# Patient Record
Sex: FEMALE | Race: BLACK OR AFRICAN AMERICAN | NOT HISPANIC OR LATINO | URBAN - METROPOLITAN AREA
[De-identification: names, ages, dates, MRNs, and addresses within clinical notes are randomized per-mention and may not be internally consistent; named-entity substitution may affect disease eponyms.]

---

## 2024-09-26 ENCOUNTER — OFFICE VISIT (OUTPATIENT)
Dept: FAMILY MEDICINE | Facility: CLINIC | Age: 19
End: 2024-09-26
Payer: COMMERCIAL

## 2024-09-26 VITALS
OXYGEN SATURATION: 98 % | RESPIRATION RATE: 16 BRPM | HEART RATE: 88 BPM | HEIGHT: 63 IN | WEIGHT: 251 LBS | DIASTOLIC BLOOD PRESSURE: 78 MMHG | SYSTOLIC BLOOD PRESSURE: 115 MMHG | BODY MASS INDEX: 44.47 KG/M2

## 2024-09-26 DIAGNOSIS — Z00.00 WELLNESS EXAMINATION: Primary | ICD-10-CM

## 2024-09-26 DIAGNOSIS — E66.01 CLASS 3 SEVERE OBESITY DUE TO EXCESS CALORIES WITHOUT SERIOUS COMORBIDITY WITH BODY MASS INDEX (BMI) OF 40.0 TO 44.9 IN ADULT: ICD-10-CM

## 2024-09-26 DIAGNOSIS — F41.1 GENERALIZED ANXIETY DISORDER: ICD-10-CM

## 2024-09-26 RX ORDER — NORGESTIMATE AND ETHINYL ESTRADIOL 7DAYSX3 28
1 KIT ORAL DAILY
COMMUNITY
Start: 2024-08-31

## 2024-09-26 RX ORDER — BUSPIRONE HYDROCHLORIDE 5 MG/1
5 TABLET ORAL 2 TIMES DAILY PRN
Qty: 60 TABLET | Refills: 2 | Status: SHIPPED | OUTPATIENT
Start: 2024-09-26 | End: 2024-12-25

## 2024-09-26 NOTE — PATIENT INSTRUCTIONS
Skip Harry,     If you are due for any health screening(s) below please notify me so we can arrange them to be ordered and scheduled. Most healthy patients at your age complete them, but you are free to accept or refuse.     If you can't do it, I'll definitely understand. If you can, I'd certainly appreciate it!    Tests to Keep You Healthy    Cervical Cancer Screening: DUE      Your cervical cancer screening is due     Our records indicate that you may be overdue for your screening Pap smear. A Pap smear is an important health screening that can detect abnormal cells that can become cervical cancer. Cervical cancer screenings allow for early diagnosis and increase the likelihood of successful treatment.     The current recommendation for Pap smear screening is every 3-5 years for women at average risk. We encourage you to schedule your appointment with your womens health provider. Many women see a gynecologist for this screening, but some primary care providers also provide Pap screening.     If you recently had your Pap smear screening performed outside of [facility], please let your health care team know so that they can update your health record.

## 2024-09-30 ENCOUNTER — LAB VISIT (OUTPATIENT)
Dept: LAB | Facility: HOSPITAL | Age: 19
End: 2024-09-30
Attending: FAMILY MEDICINE
Payer: COMMERCIAL

## 2024-09-30 DIAGNOSIS — D64.9 ANEMIA, UNSPECIFIED TYPE: ICD-10-CM

## 2024-09-30 DIAGNOSIS — Z00.00 WELLNESS EXAMINATION: ICD-10-CM

## 2024-09-30 DIAGNOSIS — E55.9 VITAMIN D DEFICIENCY: ICD-10-CM

## 2024-09-30 DIAGNOSIS — R82.71 BACTERIA IN URINE: Primary | ICD-10-CM

## 2024-09-30 LAB
25(OH)D3+25(OH)D2 SERPL-MCNC: 12 NG/ML (ref 30–80)
ALBUMIN SERPL-MCNC: 3.2 G/DL (ref 3.5–5)
ALBUMIN/GLOB SERPL: 0.9 RATIO (ref 1.1–2)
ALP SERPL-CCNC: 69 UNIT/L (ref 40–150)
ALT SERPL-CCNC: 9 UNIT/L (ref 0–55)
ANION GAP SERPL CALC-SCNC: 5 MEQ/L
AST SERPL-CCNC: 10 UNIT/L (ref 5–34)
BACTERIA #/AREA URNS AUTO: ABNORMAL /HPF
BASOPHILS # BLD AUTO: 0.05 X10(3)/MCL
BASOPHILS NFR BLD AUTO: 0.9 %
BILIRUB SERPL-MCNC: 0.4 MG/DL
BILIRUB UR QL STRIP.AUTO: NEGATIVE
BUN SERPL-MCNC: 8.2 MG/DL (ref 7–18.7)
CALCIUM SERPL-MCNC: 8.6 MG/DL (ref 8.4–10.2)
CHLORIDE SERPL-SCNC: 110 MMOL/L (ref 98–107)
CHOLEST SERPL-MCNC: 161 MG/DL
CHOLEST/HDLC SERPL: 4 {RATIO} (ref 0–5)
CLARITY UR: CLEAR
CO2 SERPL-SCNC: 24 MMOL/L (ref 22–29)
COLOR UR AUTO: YELLOW
CREAT SERPL-MCNC: 0.71 MG/DL (ref 0.55–1.02)
CREAT/UREA NIT SERPL: 12
EOSINOPHIL # BLD AUTO: 0.17 X10(3)/MCL (ref 0–0.9)
EOSINOPHIL NFR BLD AUTO: 2.9 %
ERYTHROCYTE [DISTWIDTH] IN BLOOD BY AUTOMATED COUNT: 12.5 % (ref 11.5–17)
EST. AVERAGE GLUCOSE BLD GHB EST-MCNC: 99.7 MG/DL
GFR SERPLBLD CREATININE-BSD FMLA CKD-EPI: >60 ML/MIN/1.73/M2
GLOBULIN SER-MCNC: 3.5 GM/DL (ref 2.4–3.5)
GLUCOSE SERPL-MCNC: 92 MG/DL (ref 74–100)
GLUCOSE UR QL STRIP: NORMAL
HBA1C MFR BLD: 5.1 %
HCT VFR BLD AUTO: 36.4 % (ref 37–47)
HCV AB SERPL QL IA: NONREACTIVE
HDLC SERPL-MCNC: 44 MG/DL (ref 35–60)
HGB BLD-MCNC: 11.7 G/DL (ref 12–16)
HGB UR QL STRIP: ABNORMAL
HIV 1+2 AB+HIV1 P24 AG SERPL QL IA: NONREACTIVE
IMM GRANULOCYTES # BLD AUTO: 0.01 X10(3)/MCL (ref 0–0.04)
IMM GRANULOCYTES NFR BLD AUTO: 0.2 %
KETONES UR QL STRIP: NEGATIVE
LDLC SERPL CALC-MCNC: 98 MG/DL (ref 50–140)
LEUKOCYTE ESTERASE UR QL STRIP: 75
LYMPHOCYTES # BLD AUTO: 2.65 X10(3)/MCL (ref 0.6–4.6)
LYMPHOCYTES NFR BLD AUTO: 45.8 %
MCH RBC QN AUTO: 29.3 PG (ref 27–31)
MCHC RBC AUTO-ENTMCNC: 32.1 G/DL (ref 33–36)
MCV RBC AUTO: 91 FL (ref 80–94)
MONOCYTES # BLD AUTO: 0.34 X10(3)/MCL (ref 0.1–1.3)
MONOCYTES NFR BLD AUTO: 5.9 %
MUCOUS THREADS URNS QL MICRO: ABNORMAL /LPF
NEUTROPHILS # BLD AUTO: 2.57 X10(3)/MCL (ref 2.1–9.2)
NEUTROPHILS NFR BLD AUTO: 44.3 %
NITRITE UR QL STRIP: NEGATIVE
NRBC BLD AUTO-RTO: 0 %
PH UR STRIP: 6 [PH]
PLATELET # BLD AUTO: 255 X10(3)/MCL (ref 130–400)
PMV BLD AUTO: 10.7 FL (ref 7.4–10.4)
POTASSIUM SERPL-SCNC: 4 MMOL/L (ref 3.5–5.1)
PROT SERPL-MCNC: 6.7 GM/DL (ref 6.4–8.3)
PROT UR QL STRIP: ABNORMAL
RBC # BLD AUTO: 4 X10(6)/MCL (ref 4.2–5.4)
RBC #/AREA URNS AUTO: ABNORMAL /HPF
SODIUM SERPL-SCNC: 139 MMOL/L (ref 136–145)
SP GR UR STRIP.AUTO: 1.03 (ref 1–1.03)
SQUAMOUS #/AREA URNS LPF: ABNORMAL /HPF
TRIGL SERPL-MCNC: 93 MG/DL (ref 37–140)
TSH SERPL-ACNC: 4.36 UIU/ML (ref 0.35–4.94)
UROBILINOGEN UR STRIP-ACNC: NORMAL
VLDLC SERPL CALC-MCNC: 19 MG/DL
WBC # BLD AUTO: 5.79 X10(3)/MCL (ref 4.5–11.5)
WBC #/AREA URNS AUTO: ABNORMAL /HPF

## 2024-09-30 PROCEDURE — 83036 HEMOGLOBIN GLYCOSYLATED A1C: CPT

## 2024-09-30 PROCEDURE — 81001 URINALYSIS AUTO W/SCOPE: CPT

## 2024-09-30 PROCEDURE — 87591 N.GONORRHOEAE DNA AMP PROB: CPT

## 2024-09-30 PROCEDURE — 87389 HIV-1 AG W/HIV-1&-2 AB AG IA: CPT

## 2024-09-30 PROCEDURE — 86803 HEPATITIS C AB TEST: CPT

## 2024-09-30 PROCEDURE — 80061 LIPID PANEL: CPT

## 2024-09-30 PROCEDURE — 36415 COLL VENOUS BLD VENIPUNCTURE: CPT

## 2024-09-30 PROCEDURE — 80053 COMPREHEN METABOLIC PANEL: CPT

## 2024-09-30 PROCEDURE — 85025 COMPLETE CBC W/AUTO DIFF WBC: CPT

## 2024-09-30 PROCEDURE — 81015 MICROSCOPIC EXAM OF URINE: CPT

## 2024-09-30 PROCEDURE — 84443 ASSAY THYROID STIM HORMONE: CPT

## 2024-09-30 PROCEDURE — 87491 CHLMYD TRACH DNA AMP PROBE: CPT

## 2024-09-30 PROCEDURE — 82306 VITAMIN D 25 HYDROXY: CPT

## 2024-09-30 RX ORDER — ERGOCALCIFEROL 1.25 MG/1
50000 CAPSULE ORAL
Qty: 12 CAPSULE | Refills: 0 | Status: SHIPPED | OUTPATIENT
Start: 2024-09-30 | End: 2024-12-29

## 2024-10-01 LAB
C TRACH RRNA SPEC QL NAA+PROBE: NEGATIVE
N GONORRHOEA RRNA SPEC QL NAA+PROBE: NEGATIVE
SPECIMEN SOURCE: NORMAL
SPECIMEN SOURCE: NORMAL

## 2024-10-02 ENCOUNTER — LAB VISIT (OUTPATIENT)
Dept: LAB | Facility: HOSPITAL | Age: 19
End: 2024-10-02
Attending: FAMILY MEDICINE
Payer: COMMERCIAL

## 2024-10-02 ENCOUNTER — TELEPHONE (OUTPATIENT)
Dept: FAMILY MEDICINE | Facility: CLINIC | Age: 19
End: 2024-10-02
Payer: COMMERCIAL

## 2024-10-02 ENCOUNTER — PATIENT MESSAGE (OUTPATIENT)
Dept: FAMILY MEDICINE | Facility: CLINIC | Age: 19
End: 2024-10-02
Payer: COMMERCIAL

## 2024-10-02 DIAGNOSIS — R82.71 BACTERIA IN URINE: ICD-10-CM

## 2024-10-02 PROCEDURE — 87086 URINE CULTURE/COLONY COUNT: CPT

## 2024-10-02 NOTE — TELEPHONE ENCOUNTER
----- Message from Love Smiley MD sent at 9/30/2024  6:46 PM CDT -----  - Vitamin D is low, 12, normal: 30-80, I sent a prescription for once weekly Vitamin D2 to take for a 3 month course, get 15 minutes of sunlight daily wearing sunscreen, recheck Vitamin D level in 12/2024.    - She has trace protein, microscopic blood in her urine, white blood cells, red blood cells, and squamous cells in her urine, could be due to urinary tract infection or contaminated specimen, order to add urine culture to lab is in file for further evaluation, my office staff will contact the lab to add.   - She is mildly anemic, needs to follow iron rich diet, recheck CBC and iron panel in 12/2024.   - Testing is negative for HIV and Hepatitis C.   - Testing for gonorrhea and chlamydia is still pending.   - Remaining labs are essentially normal.

## 2024-10-04 ENCOUNTER — PATIENT MESSAGE (OUTPATIENT)
Dept: FAMILY MEDICINE | Facility: CLINIC | Age: 19
End: 2024-10-04
Payer: COMMERCIAL

## 2024-10-04 LAB — BACTERIA UR CULT: NORMAL

## 2025-01-26 ENCOUNTER — OFFICE VISIT (OUTPATIENT)
Dept: URGENT CARE | Facility: CLINIC | Age: 20
End: 2025-01-26
Payer: COMMERCIAL

## 2025-01-26 VITALS
RESPIRATION RATE: 18 BRPM | WEIGHT: 242 LBS | SYSTOLIC BLOOD PRESSURE: 123 MMHG | DIASTOLIC BLOOD PRESSURE: 85 MMHG | OXYGEN SATURATION: 100 % | TEMPERATURE: 98 F | BODY MASS INDEX: 42.88 KG/M2 | HEIGHT: 63 IN | HEART RATE: 86 BPM

## 2025-01-26 DIAGNOSIS — J02.9 SORE THROAT: ICD-10-CM

## 2025-01-26 DIAGNOSIS — J06.9 VIRAL URI: Primary | ICD-10-CM

## 2025-01-26 LAB
CTP QC/QA: YES
FLUAV AG NPH QL: NEGATIVE
FLUBV AG NPH QL: NEGATIVE
MOLECULAR STREP A: NEGATIVE
SARS-COV-2 AG RESP QL IA.RAPID: NEGATIVE

## 2025-01-26 PROCEDURE — 87651 STREP A DNA AMP PROBE: CPT | Mod: QW,,,

## 2025-01-26 PROCEDURE — 99213 OFFICE O/P EST LOW 20 MIN: CPT | Mod: 25,,,

## 2025-01-26 PROCEDURE — 96372 THER/PROPH/DIAG INJ SC/IM: CPT | Mod: ,,,

## 2025-01-26 PROCEDURE — 87811 SARS-COV-2 COVID19 W/OPTIC: CPT | Mod: QW,,,

## 2025-01-26 PROCEDURE — 87804 INFLUENZA ASSAY W/OPTIC: CPT | Mod: QW,,,

## 2025-01-26 RX ORDER — DEXAMETHASONE SODIUM PHOSPHATE 4 MG/ML
8 INJECTION, SOLUTION INTRA-ARTICULAR; INTRALESIONAL; INTRAMUSCULAR; INTRAVENOUS; SOFT TISSUE
Status: DISCONTINUED | OUTPATIENT
Start: 2025-01-26 | End: 2025-01-26

## 2025-01-26 RX ORDER — DEXAMETHASONE SODIUM PHOSPHATE 10 MG/ML
8 INJECTION INTRAMUSCULAR; INTRAVENOUS
Status: COMPLETED | OUTPATIENT
Start: 2025-01-26 | End: 2025-01-26

## 2025-01-26 RX ADMIN — DEXAMETHASONE SODIUM PHOSPHATE 8 MG: 10 INJECTION INTRAMUSCULAR; INTRAVENOUS at 12:01

## 2025-01-26 NOTE — PROGRESS NOTES
Subjective:      Patient ID: Kamryn Gauthier is a 19 y.o. female.    Vitals:  vitals were not taken for this visit.     Chief Complaint: Nasal Congestion     Patient is a 19 y.o. female who presents to urgent care with complaints of congestion, headache, body aches, throat irritation since yesterday. Alleviating factors include none. Patient denies cough, nausea, vomiting, diarrhea.     ROS   Objective:     Physical Exam    Assessment:     No diagnosis found.    Plan:       There are no diagnoses linked to this encounter.

## 2025-01-26 NOTE — PATIENT INSTRUCTIONS
Flu and strep negative. It may have been too early to test as symptoms just started.  It is recommended that you return for a nurse visit in 1-2 days and get re-swabbed if symptoms continue.

## 2025-01-26 NOTE — PROGRESS NOTES
"Subjective:      Patient ID: Kamryn Gauthier is a 19 y.o. female.    Vitals:  height is 5' 3" (1.6 m) and weight is 109.8 kg (242 lb). Her oral temperature is 98.2 °F (36.8 °C). Her blood pressure is 123/85 and her pulse is 86. Her respiration is 18 and oxygen saturation is 100%.     Chief Complaint: Nasal Congestion    Kamryn Gauthier, a 19-year-old female, arrives at urgent care with symptoms of congestion, headache, and body aches that have persisted for two days. She indicates that there are no factors that provide relief. Accompanying her is her mother. The patient, who works in the ICU, is uncertain if she has been exposed to any illnesses. She denies experiencing any fevers or chills.       Previous History      Review of patient's allergies indicates:   Allergen Reactions    Penicillins Rash     Childhood allergy        Past Medical History:   Diagnosis Date    Anxiety      Current Outpatient Medications   Medication Instructions    busPIRone (BUSPAR) 5 mg, Oral, 2 times daily PRN    TRI-SPRINTEC, 28, 0.18/0.215/0.25 mg-35 mcg (28) tablet 1 tablet, Daily     Past Surgical History:   Procedure Laterality Date    ADENOIDECTOMY      TONSILLECTOMY Bilateral     WISDOM TOOTH EXTRACTION       Family History   Problem Relation Name Age of Onset    Diabetes Mother      Hypertension Mother      Hypertension Father         Social History     Tobacco Use    Smoking status: Never    Smokeless tobacco: Never   Substance Use Topics    Alcohol use: Yes     Comment: socially    Drug use: Never      Labs     Results for orders placed or performed in visit on 01/26/25   POCT Strep A, Molecular    Collection Time: 01/26/25 11:21 AM   Result Value Ref Range    Molecular Strep A, POC Negative Negative     Acceptable Yes    POCT Influenza A/B    Collection Time: 01/26/25 11:28 AM   Result Value Ref Range    Rapid Influenza A Ag Negative Negative    Rapid Influenza B Ag Negative Negative     " Acceptable Yes    SARS Coronavirus 2 Antigen, POCT Manual Read    Collection Time: 01/26/25 11:30 AM   Result Value Ref Range    SARS Coronavirus 2 Antigen Negative Negative     Acceptable Yes      Objective:     Physical Exam   Constitutional: She is oriented to person, place, and time. She appears well-developed. She is cooperative.   HENT:   Head: Normocephalic and atraumatic.   Ears:   Right Ear: Hearing, tympanic membrane, external ear and ear canal normal.   Left Ear: Hearing, tympanic membrane, external ear and ear canal normal.   Nose: Rhinorrhea and sinus tenderness present. No mucosal edema or nasal deformity. No epistaxis. Right sinus exhibits no maxillary sinus tenderness and no frontal sinus tenderness. Left sinus exhibits no maxillary sinus tenderness and no frontal sinus tenderness.   Mouth/Throat: Uvula is midline and mucous membranes are normal. No trismus in the jaw. Normal dentition. Uvula swelling present. Posterior oropharyngeal erythema present.   Eyes: Conjunctivae and lids are normal.   Neck: Trachea normal and phonation normal. Neck supple.   Cardiovascular: Normal rate, regular rhythm, normal heart sounds and normal pulses.   Pulmonary/Chest: Effort normal and breath sounds normal.   Abdominal: Normal appearance and bowel sounds are normal. Soft.   Musculoskeletal: Normal range of motion.         General: Normal range of motion.   Neurological: She is alert and oriented to person, place, and time. She exhibits normal muscle tone.   Skin: Skin is warm, dry and intact.   Psychiatric: Her speech is normal and behavior is normal. Judgment and thought content normal.   Nursing note and vitals reviewed.    Assessment:     1. Viral URI    2. Sore throat      Plan:     Viral URI  -     Discontinue: dexAMETHasone injection 8 mg  -     dexAMETHasone injection 8 mg    Sore throat  -     SARS Coronavirus 2 Antigen, POCT Manual Read  -     POCT Influenza A/B  -     POCT Strep A,  Molecular  -     dexAMETHasone injection 8 mg    Flu and Strep negative.  It may have been too early to test as symptoms just started.  It is recommended that you return for a nurse visit in 1-2 days and get re-swabbed if symptoms continue.     Use OTC cold meds for symptoms (HTN and DM pt to avoid Sudafed or pseudoephedrine products).  Use OTC Tylenol or Advil for fever or body aches.  Get plenty of rest, eat a healthy diet, avoid alcohol and smoking.  Sore Throat: Use OTC lozenges or throat sprays, gargle with warm salt and water, warm tea with honey.  Nasal Congestion: Use OTC Mucinex D, humidifier or steamy shower.  Cough: Use Dextromethorphan/Doxylamine Cough Syrup at night.  Report fever greater than 101 F, breathing problems, painful or worsening cough, or changes in mucous (green, yellow, bloody).  Cover your mouth with coughing, avoid sharing cups or lip balm, wash your hand before eating or touching your face.